# Patient Record
Sex: FEMALE | URBAN - METROPOLITAN AREA
[De-identification: names, ages, dates, MRNs, and addresses within clinical notes are randomized per-mention and may not be internally consistent; named-entity substitution may affect disease eponyms.]

---

## 2024-11-04 ENCOUNTER — APPOINTMENT (RX ONLY)
Dept: URBAN - METROPOLITAN AREA CLINIC 82 | Facility: CLINIC | Age: 64
Setting detail: DERMATOLOGY
End: 2024-11-04

## 2024-11-04 DIAGNOSIS — L30.9 DERMATITIS, UNSPECIFIED: ICD-10-CM

## 2024-11-04 PROBLEM — L91.8 OTHER HYPERTROPHIC DISORDERS OF THE SKIN: Status: ACTIVE | Noted: 2024-11-04

## 2024-11-04 PROCEDURE — ? TREATMENT REGIMEN

## 2024-11-04 PROCEDURE — 99243 OFF/OP CNSLTJ NEW/EST LOW 30: CPT

## 2024-11-04 PROCEDURE — ? PRESCRIPTION

## 2024-11-04 PROCEDURE — ? COUNSELING

## 2024-11-04 RX ORDER — HYDROCORTISONE 25 MG/G
CREAM TOPICAL
Qty: 30 | Refills: 2 | Status: ERX | COMMUNITY
Start: 2024-11-04

## 2024-11-04 RX ADMIN — HYDROCORTISONE: 25 CREAM TOPICAL at 00:00

## 2024-11-04 NOTE — PROCEDURE: TREATMENT REGIMEN
Initiate Treatment: hydrocortisone 2.5 % topical cream \\nApply small amount to affected areas on face twice daily every other day
Detail Level: Zone

## 2025-03-21 ENCOUNTER — HOSPITAL ENCOUNTER (EMERGENCY)
Age: 65
Discharge: HOME | End: 2025-03-21
Payer: MEDICAID

## 2025-03-21 VITALS
HEART RATE: 53 BPM | RESPIRATION RATE: 18 BRPM | OXYGEN SATURATION: 96 % | SYSTOLIC BLOOD PRESSURE: 135 MMHG | DIASTOLIC BLOOD PRESSURE: 74 MMHG | TEMPERATURE: 98.5 F

## 2025-03-21 DIAGNOSIS — N39.0: Primary | ICD-10-CM

## 2025-03-21 DIAGNOSIS — R10.31: ICD-10-CM

## 2025-03-21 LAB
ALANINE AMINOTRANSFERASE: 20 U/L (ref 10–49)
ALBUMIN, SERUM: 4.5 GM/DL (ref 3.4–4.8)
ALBUMIN/GLOBULIN RATIO: 1.5 (ref 1.2–2.2)
ALKALINE PHOSPHATASE: 75 U/L (ref 46–116)
ANION GAP: 7 (ref 7–16)
ASPARTATE AMINO TRANSFERASE: 24 U/L (ref 0–34)
BASOPHILS # (AUTO): 0 THOU/MM3 (ref 0–0.2)
BASOPHILS % (AUTO): 0 % (ref 0–2.5)
BILIRUBIN,TOTAL: 0.9 MG/DL (ref 0.3–1.2)
BLOOD UREA NITROGEN: 16 MG/DL (ref 9–23)
BUN/CREATININE RATIO: 13 RATIO (ref 12–20)
CALCIUM (CORRECTED): 9.8 MG/DL (ref 8.5–10.1)
CALCIUM: 9.8 MG/DL (ref 8.3–10.6)
CARBON DIOXIDE: 29.1 MMOL/L (ref 20–31)
CHLORIDE: 105 MMOL/L (ref 98–107)
CREAT CL PREDICTED SERPL C-G-VRATE: 59.3 ML/MIN (ref 60–?)
CREATININE (COMPONENT): 1.2 MG/DL (ref 0.6–1.3)
EGFR: 51 SEE NOTE
EOSINOPHILS # (AUTO): 0.3 THOU/MM3 (ref 0–0.5)
EOSINOPHILS % (AUTO): 4 % (ref 0–10)
GLOBULIN: 3.1 GM/DL (ref 2.3–3.5)
GLUCOSE: 102 MG/DL (ref 74–106)
HEMATOCRIT: 42.2 % (ref 36–46)
HEMOGLOBIN: 14 G/DL (ref 12–16)
IMM GRANULOCYTES # BLD AUTO: 0.04 THOU/MM3 (ref 0–0)
IMMATURE GRANULOCYTES % (AUTO): 1 % (ref 0–0)
INR: 1 (ref 0.9–1.3)
LIPASE: 59 U/L (ref 12–53)
LYMPHOCYTES # (AUTO): 1.2 THOU/MM3 (ref 1–4.8)
LYMPHOCYTES % (AUTO): 16 % (ref 10–50)
MEAN CORPUSCULAR HEMOGLOBIN: 29.2 PG (ref 25–35)
MEAN CORPUSCULAR HGB CONC: 33.2 G/DL (ref 31–37)
MEAN CORPUSCULAR VOLUME: 88 FL (ref 80–100)
MONOCYTES # (AUTO): 0.5 THOU/MM3 (ref 0–0.8)
MONOCYTES % (AUTO): 7 % (ref 0–12)
NEUTROPHILS # (AUTO): 5.4 THOU/MM3 (ref 1.8–7.7)
NEUTROPHILS % (AUTO): 73 % (ref 37–80)
NRBC BLD-RTO: 0 /100 WBC
NUCLEATED RED BLOOD CELL #: 0 THOU/MM3 (ref 0–0)
OSMOLALITY,CALCULATED: 282 (ref 275–295)
PH,URINE: 6.5 (ref 5–7)
PLATELET COUNT: 252 THOU/MM3 (ref 140–440)
POTASSIUM: 4.1 MMOL/L (ref 3.4–5.1)
PROTHROMBIN TIME: 11.1 SECONDS (ref 9–12.2)
RBC,URINE: 3 /HPF (ref 0–3)
RDW STANDARD DEVIATION: 45.3 FL (ref 36.4–46.3)
RED BLOOD COUNT: 4.8 MILN/MM3 (ref 4–5.2)
SODIUM: 141 MMOL/L (ref 136–145)
SPECIFIC GRAVITY,URINE: 1.02 (ref 1–1.03)
SQUAMOUS EPITHELIAL CELL,URINE: 2 /HPF (ref 0–5)
TOTAL PROTEIN: 7.6 GM/DL (ref 5.7–8.2)
WBC,URINE: 17 /HPF (ref 0–5)
WHITE BLOOD COUNT: 7.4 THOU/MM3 (ref 3.6–11)

## 2025-03-21 PROCEDURE — 85025 COMPLETE CBC W/AUTO DIFF WBC: CPT

## 2025-03-21 PROCEDURE — 74176 CT ABD & PELVIS W/O CONTRAST: CPT

## 2025-03-21 PROCEDURE — 83690 ASSAY OF LIPASE: CPT

## 2025-03-21 PROCEDURE — 85610 PROTHROMBIN TIME: CPT

## 2025-03-21 PROCEDURE — 36415 COLL VENOUS BLD VENIPUNCTURE: CPT

## 2025-03-21 PROCEDURE — 81001 URINALYSIS AUTO W/SCOPE: CPT

## 2025-03-21 PROCEDURE — 99284 EMERGENCY DEPT VISIT MOD MDM: CPT

## 2025-03-21 PROCEDURE — 80053 COMPREHEN METABOLIC PANEL: CPT

## 2025-07-25 ENCOUNTER — HOSPITAL ENCOUNTER (OUTPATIENT)
Dept: HOSPITAL 104 - CDIM | Age: 65
Discharge: HOME | End: 2025-07-25
Payer: MEDICAID

## 2025-07-25 DIAGNOSIS — M65.332: Primary | ICD-10-CM

## 2025-07-25 PROCEDURE — 73130 X-RAY EXAM OF HAND: CPT
